# Patient Record
Sex: FEMALE | Race: OTHER
[De-identification: names, ages, dates, MRNs, and addresses within clinical notes are randomized per-mention and may not be internally consistent; named-entity substitution may affect disease eponyms.]

---

## 2018-10-22 ENCOUNTER — HOSPITAL ENCOUNTER (EMERGENCY)
Dept: HOSPITAL 74 - JERFT | Age: 28
Discharge: HOME | End: 2018-10-22
Payer: COMMERCIAL

## 2018-10-22 VITALS — DIASTOLIC BLOOD PRESSURE: 90 MMHG | TEMPERATURE: 97.9 F | HEART RATE: 105 BPM | SYSTOLIC BLOOD PRESSURE: 145 MMHG

## 2018-10-22 VITALS — BODY MASS INDEX: 43.5 KG/M2
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
## 2018-10-22 DIAGNOSIS — S46.811A: Primary | ICD-10-CM
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
## 2022-06-24 ENCOUNTER — HOSPITAL ENCOUNTER (EMERGENCY)
Dept: HOSPITAL 74 - FER | Age: 32
Discharge: HOME | End: 2022-06-24
Payer: COMMERCIAL

## 2022-06-24 VITALS — HEART RATE: 110 BPM | SYSTOLIC BLOOD PRESSURE: 155 MMHG | DIASTOLIC BLOOD PRESSURE: 98 MMHG | TEMPERATURE: 98.6 F

## 2022-06-24 VITALS — BODY MASS INDEX: 52 KG/M2

## 2022-06-24 DIAGNOSIS — F41.9: ICD-10-CM

## 2022-06-24 DIAGNOSIS — R07.0: ICD-10-CM

## 2022-06-24 DIAGNOSIS — M54.89: Primary | ICD-10-CM
